# Patient Record
Sex: FEMALE | Race: WHITE | HISPANIC OR LATINO
[De-identification: names, ages, dates, MRNs, and addresses within clinical notes are randomized per-mention and may not be internally consistent; named-entity substitution may affect disease eponyms.]

---

## 2017-11-02 ENCOUNTER — LABORATORY RESULT (OUTPATIENT)
Age: 22
End: 2017-11-02

## 2017-11-03 ENCOUNTER — APPOINTMENT (OUTPATIENT)
Dept: OBGYN | Facility: CLINIC | Age: 22
End: 2017-11-03
Payer: COMMERCIAL

## 2017-11-03 VITALS
HEIGHT: 66 IN | SYSTOLIC BLOOD PRESSURE: 100 MMHG | BODY MASS INDEX: 19.44 KG/M2 | DIASTOLIC BLOOD PRESSURE: 80 MMHG | WEIGHT: 121 LBS

## 2017-11-03 PROCEDURE — 99395 PREV VISIT EST AGE 18-39: CPT

## 2019-09-19 ENCOUNTER — APPOINTMENT (OUTPATIENT)
Dept: OBGYN | Facility: CLINIC | Age: 24
End: 2019-09-19

## 2020-09-18 ENCOUNTER — LABORATORY RESULT (OUTPATIENT)
Age: 25
End: 2020-09-18

## 2020-09-18 ENCOUNTER — APPOINTMENT (OUTPATIENT)
Dept: OBGYN | Facility: CLINIC | Age: 25
End: 2020-09-18
Payer: COMMERCIAL

## 2020-09-18 ENCOUNTER — ASOB RESULT (OUTPATIENT)
Age: 25
End: 2020-09-18

## 2020-09-18 VITALS — SYSTOLIC BLOOD PRESSURE: 110 MMHG | DIASTOLIC BLOOD PRESSURE: 75 MMHG | WEIGHT: 122 LBS

## 2020-09-18 PROCEDURE — 99395 PREV VISIT EST AGE 18-39: CPT

## 2020-09-18 PROCEDURE — 76856 US EXAM PELVIC COMPLETE: CPT

## 2020-09-18 NOTE — PHYSICAL EXAM
[Examination Of The Breasts] : a normal appearance [No Masses] : no breast masses were palpable [Labia Majora] : normal [Labia Minora] : normal [Normal] : normal [Uterine Adnexae] : normal

## 2020-09-18 NOTE — COUNSELING
[Nutrition/ Exercise/ Weight Management] : nutrition, exercise, weight management [Body Image] : body image [Vitamins/Supplements] : vitamins/supplements [Contraception/ Emergency Contraception/ Safe Sexual Practices] : contraception, emergency contraception, safe sexual practices [STD (testing, results, tx)] : STD (testing, results, tx)

## 2020-09-21 ENCOUNTER — TRANSCRIPTION ENCOUNTER (OUTPATIENT)
Age: 25
End: 2020-09-21

## 2021-11-12 ENCOUNTER — APPOINTMENT (OUTPATIENT)
Dept: OBGYN | Facility: CLINIC | Age: 26
End: 2021-11-12

## 2022-10-05 ENCOUNTER — APPOINTMENT (OUTPATIENT)
Dept: OBGYN | Facility: CLINIC | Age: 27
End: 2022-10-05

## 2022-10-05 VITALS
WEIGHT: 123 LBS | DIASTOLIC BLOOD PRESSURE: 65 MMHG | OXYGEN SATURATION: 100 % | SYSTOLIC BLOOD PRESSURE: 106 MMHG | HEART RATE: 89 BPM

## 2022-10-05 PROCEDURE — 99395 PREV VISIT EST AGE 18-39: CPT

## 2022-10-05 RX ORDER — NORGESTIMATE AND ETHINYL ESTRADIOL 7DAYSX3 28
0.18/0.215/0.25 KIT ORAL
Qty: 1 | Refills: 6 | Status: COMPLETED | COMMUNITY
Start: 2018-04-20 | End: 2022-10-05

## 2022-10-05 NOTE — HISTORY OF PRESENT ILLNESS
[FreeTextEntry1] : Patient is a 27 year old female who presents for her annual exam.  Reports normal menses, no abdominal or pelvic pain, no change in discharge, change in bowel or bladder habits or any other concerns.  Due for pap.  Not currently sexually active.  States that she has a lot of breast tenderness prior to menses and would like sonogram.

## 2022-10-05 NOTE — REVIEW OF SYSTEMS
VITALS: reviewed  GEN: NAD  HEAD/EYES: NCAT, PERRL, EOMI, anicteric sclerae, no conjunctival pallor  ENT: mucus membranes moist, oropharynx WNL, trachea midline  RESP: lungs CTA with equal breath sounds bilaterally, chest wall nontender and atraumatic  CV: heart with reg rhythm S1, S2, distal pulses intact and symmetric bilaterally  ABDOMEN: normoactive bowel sounds, soft, nondistended, nontender, no palpable masses  : no CVAT  MSK: extremities atraumatic and nontender, no edema, no asymmetry.   SKIN: warm, dry, no rash, no bruising, no cyanosis. color appropriate for ethnicity  NEURO: alert, mentating appropriately, no facial asymmetry.   PSYCH: Affect appropriate
[Negative] : Heme/Lymph

## 2022-10-08 LAB
C TRACH RRNA SPEC QL NAA+PROBE: NOT DETECTED
N GONORRHOEA RRNA SPEC QL NAA+PROBE: NOT DETECTED
SOURCE TP AMPLIFICATION: NORMAL

## 2022-10-21 ENCOUNTER — APPOINTMENT (OUTPATIENT)
Dept: OBGYN | Facility: CLINIC | Age: 27
End: 2022-10-21

## 2022-10-21 VITALS — SYSTOLIC BLOOD PRESSURE: 94 MMHG | OXYGEN SATURATION: 98 % | WEIGHT: 123 LBS | DIASTOLIC BLOOD PRESSURE: 69 MMHG

## 2022-10-21 PROCEDURE — 57452 EXAM OF CERVIX W/SCOPE: CPT

## 2022-10-21 NOTE — PROCEDURE
[Colposcopy] : Colposcopy  [Time out performed] : Pre-procedure time out performed.  Patient's name, date of birth and procedure confirmed. [Consent Obtained] : Consent obtained [Risks] : risks [Benefits] : benefits [Alternatives] : alternatives [Patient] : patient [Infection] : infection [Bleeding] : bleeding [Allergic Reaction] : allergic reaction [LGSIL] : LGSIL [No Premedication] : no premedication [Colposcopy Adequate] : colposcopy adequate [No Abnormalities] : no abnormalities [Hemostasis Obtained] : Hemostasis obtained [Tolerated Well] : the patient tolerated the procedure well [Biopsy] : biopsy not taken

## 2022-11-16 LAB — CYTOLOGY CVX/VAG DOC THIN PREP: ABNORMAL

## 2023-10-27 ENCOUNTER — APPOINTMENT (OUTPATIENT)
Dept: OBGYN | Facility: CLINIC | Age: 28
End: 2023-10-27

## 2023-12-14 ENCOUNTER — APPOINTMENT (OUTPATIENT)
Dept: OBGYN | Facility: CLINIC | Age: 28
End: 2023-12-14
Payer: COMMERCIAL

## 2023-12-14 ENCOUNTER — LABORATORY RESULT (OUTPATIENT)
Age: 28
End: 2023-12-14

## 2023-12-14 VITALS — WEIGHT: 136 LBS | SYSTOLIC BLOOD PRESSURE: 110 MMHG | DIASTOLIC BLOOD PRESSURE: 66 MMHG

## 2023-12-14 DIAGNOSIS — Z30.09 ENCOUNTER FOR OTHER GENERAL COUNSELING AND ADVICE ON CONTRACEPTION: ICD-10-CM

## 2023-12-14 DIAGNOSIS — R87.612 LOW GRADE SQUAMOUS INTRAEPITHELIAL LESION ON CYTOLOGIC SMEAR OF CERVIX (LGSIL): ICD-10-CM

## 2023-12-14 DIAGNOSIS — Z01.419 ENCOUNTER FOR GYNECOLOGICAL EXAMINATION (GENERAL) (ROUTINE) W/OUT ABNORMAL FINDINGS: ICD-10-CM

## 2023-12-14 PROCEDURE — 99395 PREV VISIT EST AGE 18-39: CPT

## 2023-12-14 PROCEDURE — 99213 OFFICE O/P EST LOW 20 MIN: CPT | Mod: 25

## 2023-12-19 PROBLEM — R87.612 LGSIL ON PAP SMEAR OF CERVIX: Status: ACTIVE | Noted: 2022-10-21

## 2023-12-19 PROBLEM — Z30.09 FAMILY PLANNING: Status: ACTIVE | Noted: 2017-06-13

## 2023-12-19 NOTE — HISTORY OF PRESENT ILLNESS
[FreeTextEntry1] : PT COMES FOR PAP , DISCUSSED PRIOR PAP. she Is interested in birth control. she also wants to get pregnant. Has had perimenopause symptoms. Her periods are regular and the thyroid was checked by PCP.

## 2024-04-29 ENCOUNTER — LABORATORY RESULT (OUTPATIENT)
Age: 29
End: 2024-04-29

## 2024-04-29 ENCOUNTER — APPOINTMENT (OUTPATIENT)
Dept: RHEUMATOLOGY | Facility: CLINIC | Age: 29
End: 2024-04-29
Payer: COMMERCIAL

## 2024-04-29 VITALS
HEART RATE: 55 BPM | HEIGHT: 66 IN | WEIGHT: 131 LBS | SYSTOLIC BLOOD PRESSURE: 108 MMHG | BODY MASS INDEX: 21.05 KG/M2 | OXYGEN SATURATION: 99 % | TEMPERATURE: 98.6 F | DIASTOLIC BLOOD PRESSURE: 70 MMHG | RESPIRATION RATE: 15 BRPM

## 2024-04-29 DIAGNOSIS — R76.8 OTHER SPECIFIED ABNORMAL IMMUNOLOGICAL FINDINGS IN SERUM: ICD-10-CM

## 2024-04-29 DIAGNOSIS — F41.9 ANXIETY DISORDER, UNSPECIFIED: ICD-10-CM

## 2024-04-29 DIAGNOSIS — Z82.61 FAMILY HISTORY OF ARTHRITIS: ICD-10-CM

## 2024-04-29 DIAGNOSIS — R61 GENERALIZED HYPERHIDROSIS: ICD-10-CM

## 2024-04-29 PROCEDURE — 99204 OFFICE O/P NEW MOD 45 MIN: CPT

## 2024-04-29 RX ORDER — ACETAZOLAMIDE 125 MG/1
125 TABLET ORAL
Refills: 0 | Status: ACTIVE | COMMUNITY
Start: 2024-04-29

## 2024-04-29 RX ORDER — PROPRANOLOL HYDROCHLORIDE 10 MG/1
10 TABLET ORAL
Refills: 0 | Status: ACTIVE | COMMUNITY
Start: 2024-04-29

## 2024-04-29 NOTE — PHYSICAL EXAM
[General Appearance - Well Nourished] : well nourished [General Appearance - Well Developed] : well developed [Sclera] : the sclera and conjunctiva were normal [Auscultation Breath Sounds / Voice Sounds] : lungs were clear to auscultation bilaterally [Heart Rate And Rhythm] : heart rate was normal and rhythm regular [Heart Sounds] : normal S1 and S2 [Murmurs] : no murmurs [Breast Palpation Mass] : no palpable masses [Abdomen Soft] : soft [Abdomen Tenderness] : non-tender [] : no hepato-splenomegaly [Cervical Lymph Nodes Enlarged Posterior Bilaterally] : posterior cervical [Cervical Lymph Nodes Enlarged Anterior Bilaterally] : anterior cervical [Supraclavicular Lymph Nodes Enlarged Bilaterally] : supraclavicular [Musculoskeletal - Swelling] : no joint swelling seen [Oriented To Time, Place, And Person] : oriented to person, place, and time

## 2024-04-29 NOTE — HISTORY OF PRESENT ILLNESS
[FreeTextEntry1] : 28 F here for consultation   Pt reports severe h/a. Pt had MRI done, which "showed inflammation in brain". Pt had it done at [facility].  h/a resolved w acetazolamide.   Pt had bloodwork, and it tested positive DEVANTE.   No fevers, rashes, hair loss, oral ulcers, epistaxis, sinusitis,  swollen glands, dry mouth, dry eyes, CP, SOB, cough, vision changes, abdominal pain, GERD, n/v/d, blood in stool or urine, focal weakness, sensory loss,  Raynaud's, joint pain, swelling, weight loss.   OB: no hx of miscarriage; no hx of preeclampsia

## 2024-04-29 NOTE — ASSESSMENT
[FreeTextEntry1] : 27 y/o F w reported positive DEVANTE =h/a, resolved w medication =reported positive DEVANTE =no other systemic symptom  Explained that positive DEVANTE is a general test, which is found in high titers in autoimmune dz. Explained that positive DEVANTE is not diagnostic of AI dz, and patient wout autoimmune dz can also have high DEVANTE. Individuals w high DEVANTE are at higher risk of developing AI dz however.Explained that positive DEVANTE is a general test, which is found in high titers in autoimmune dz. Explained that positive DEVANTE is not diagnostic of AI dz, and patient wout autoimmune dz can also have high DEVANTE. Individuals w high DEVANTE are at higher risk of developing AI dz however.   Doubt pt has DEVANTE associated AI dz like SLE, as h/a resolved w no immunosuppression.  Will send serologies.   Plan -Labs w serologies, inflammatory markers -obtain MRI of head from [facility]  RTO depending on above results

## 2024-04-29 NOTE — CONSULT LETTER
[Dear  ___] : Dear  [unfilled], [Consult Letter:] : I had the pleasure of evaluating your patient, [unfilled]. [Consult Closing:] : Thank you very much for allowing me to participate in the care of this patient.  If you have any questions, please do not hesitate to contact me. [Sincerely,] : Sincerely, [FreeTextEntry2] : Dr. Michi Painter [address],  [place] [address]  [FreeTextEntry3] : Immanuel Rodriguez MD

## 2024-05-09 LAB
ALBUMIN SERPL ELPH-MCNC: 4.8 G/DL
ALP BLD-CCNC: 79 U/L
ALT SERPL-CCNC: 10 U/L
ANA SER IF-ACNC: NEGATIVE
ANION GAP SERPL CALC-SCNC: 12 MMOL/L
AST SERPL-CCNC: 13 U/L
BASOPHILS # BLD AUTO: 0.02 K/UL
BASOPHILS NFR BLD AUTO: 0.3 %
BILIRUB SERPL-MCNC: 0.2 MG/DL
BUN SERPL-MCNC: 8 MG/DL
C3 SERPL-MCNC: 106 MG/DL
C4 SERPL-MCNC: 23 MG/DL
CALCIUM SERPL-MCNC: 9.1 MG/DL
CHLORIDE SERPL-SCNC: 105 MMOL/L
CK SERPL-CCNC: 79 U/L
CO2 SERPL-SCNC: 21 MMOL/L
CREAT SERPL-MCNC: 0.67 MG/DL
CRP SERPL-MCNC: <3 MG/L
DSDNA AB SER-ACNC: <1 IU/ML
EGFR: 122 ML/MIN/1.73M2
ENA RNP AB SER IA-ACNC: 0.2 AL
ENA SM AB SER IA-ACNC: <0.2 AL
ENA SS-A AB SER IA-ACNC: <0.2 AL
ENA SS-B AB SER IA-ACNC: <0.2 AL
EOSINOPHIL # BLD AUTO: 0.04 K/UL
EOSINOPHIL NFR BLD AUTO: 0.6 %
ERYTHROCYTE [SEDIMENTATION RATE] IN BLOOD BY WESTERGREN METHOD: 16 MM/HR
G6PD SER-CCNC: 14.5 U/G HGB
GBM AB TITR SER IF: <0.2
GLUCOSE SERPL-MCNC: 98 MG/DL
HCT VFR BLD CALC: 41.9 %
HGB BLD-MCNC: 13.3 G/DL
IMM GRANULOCYTES NFR BLD AUTO: 0.3 %
LYMPHOCYTES # BLD AUTO: 2.44 K/UL
LYMPHOCYTES NFR BLD AUTO: 35.1 %
MAN DIFF?: NORMAL
MCHC RBC-ENTMCNC: 29.6 PG
MCHC RBC-ENTMCNC: 31.7 GM/DL
MCV RBC AUTO: 93.3 FL
MONOCYTES # BLD AUTO: 0.45 K/UL
MONOCYTES NFR BLD AUTO: 6.5 %
MYELOPEROXIDASE AB SER QL IA: <5 UNITS
MYELOPEROXIDASE CELLS FLD QL: NEGATIVE
NEUTROPHILS # BLD AUTO: 3.98 K/UL
NEUTROPHILS NFR BLD AUTO: 57.2 %
PLATELET # BLD AUTO: 318 K/UL
POTASSIUM SERPL-SCNC: 3.7 MMOL/L
PROT SERPL-MCNC: 7.4 G/DL
PROTEINASE3 AB SER IA-ACNC: <5 UNITS
PROTEINASE3 AB SER-ACNC: NEGATIVE
RBC # BLD: 4.49 M/UL
RBC # FLD: 13.8 %
SODIUM SERPL-SCNC: 138 MMOL/L
WBC # FLD AUTO: 6.95 K/UL

## 2025-01-09 ENCOUNTER — LABORATORY RESULT (OUTPATIENT)
Age: 30
End: 2025-01-09

## 2025-01-09 ENCOUNTER — APPOINTMENT (OUTPATIENT)
Dept: OBGYN | Facility: CLINIC | Age: 30
End: 2025-01-09

## 2025-01-09 ENCOUNTER — NON-APPOINTMENT (OUTPATIENT)
Age: 30
End: 2025-01-09

## 2025-01-09 VITALS — BODY MASS INDEX: 20.01 KG/M2 | DIASTOLIC BLOOD PRESSURE: 77 MMHG | SYSTOLIC BLOOD PRESSURE: 112 MMHG | WEIGHT: 124 LBS

## 2025-01-09 DIAGNOSIS — Z01.419 ENCOUNTER FOR GYNECOLOGICAL EXAMINATION (GENERAL) (ROUTINE) W/OUT ABNORMAL FINDINGS: ICD-10-CM

## 2025-01-09 PROCEDURE — 99395 PREV VISIT EST AGE 18-39: CPT

## 2025-02-20 ENCOUNTER — LABORATORY RESULT (OUTPATIENT)
Age: 30
End: 2025-02-20

## 2025-02-20 ENCOUNTER — APPOINTMENT (OUTPATIENT)
Dept: OBGYN | Facility: CLINIC | Age: 30
End: 2025-02-20
Payer: COMMERCIAL

## 2025-02-20 VITALS
WEIGHT: 126 LBS | OXYGEN SATURATION: 100 % | BODY MASS INDEX: 20.34 KG/M2 | DIASTOLIC BLOOD PRESSURE: 66 MMHG | SYSTOLIC BLOOD PRESSURE: 109 MMHG | HEART RATE: 84 BPM

## 2025-02-20 DIAGNOSIS — Z12.4 ENCOUNTER FOR SCREENING FOR MALIGNANT NEOPLASM OF CERVIX: ICD-10-CM

## 2025-02-20 PROCEDURE — 99213 OFFICE O/P EST LOW 20 MIN: CPT

## 2025-08-08 ENCOUNTER — APPOINTMENT (OUTPATIENT)
Dept: OBGYN | Facility: CLINIC | Age: 30
End: 2025-08-08